# Patient Record
Sex: MALE | Race: WHITE | NOT HISPANIC OR LATINO
[De-identification: names, ages, dates, MRNs, and addresses within clinical notes are randomized per-mention and may not be internally consistent; named-entity substitution may affect disease eponyms.]

---

## 2023-08-07 ENCOUNTER — APPOINTMENT (OUTPATIENT)
Dept: UROLOGY | Facility: CLINIC | Age: 73
End: 2023-08-07
Payer: SELF-PAY

## 2023-08-07 DIAGNOSIS — N48.6 INDURATION PENIS PLASTICA: ICD-10-CM

## 2023-08-07 DIAGNOSIS — R06.2 WHEEZING: ICD-10-CM

## 2023-08-07 DIAGNOSIS — R30.0 DYSURIA: ICD-10-CM

## 2023-08-07 DIAGNOSIS — J30.1 ALLERGIC RHINITIS DUE TO POLLEN: ICD-10-CM

## 2023-08-07 DIAGNOSIS — J45.909 UNSPECIFIED ASTHMA, UNCOMPLICATED: ICD-10-CM

## 2023-08-07 DIAGNOSIS — E78.00 PURE HYPERCHOLESTEROLEMIA, UNSPECIFIED: ICD-10-CM

## 2023-08-07 DIAGNOSIS — N52.9 MALE ERECTILE DYSFUNCTION, UNSPECIFIED: ICD-10-CM

## 2023-08-07 DIAGNOSIS — I51.9 HEART DISEASE, UNSPECIFIED: ICD-10-CM

## 2023-08-07 DIAGNOSIS — J34.9 UNSPECIFIED DISORDER OF NOSE AND NASAL SINUSES: ICD-10-CM

## 2023-08-07 DIAGNOSIS — Z78.9 OTHER SPECIFIED HEALTH STATUS: ICD-10-CM

## 2023-08-07 DIAGNOSIS — F41.9 ANXIETY DISORDER, UNSPECIFIED: ICD-10-CM

## 2023-08-07 DIAGNOSIS — R35.0 FREQUENCY OF MICTURITION: ICD-10-CM

## 2023-08-07 PROCEDURE — 99203 OFFICE O/P NEW LOW 30 MIN: CPT | Mod: 95

## 2023-08-07 RX ORDER — SERTRALINE HYDROCHLORIDE 50 MG/1
50 TABLET, FILM COATED ORAL
Refills: 0 | Status: ACTIVE | COMMUNITY

## 2023-08-07 RX ORDER — ROSUVASTATIN CALCIUM 5 MG/1
5 TABLET, FILM COATED ORAL
Refills: 0 | Status: ACTIVE | COMMUNITY

## 2023-08-07 RX ORDER — EZETIMIBE 10 MG/1
TABLET ORAL
Refills: 0 | Status: ACTIVE | COMMUNITY

## 2023-08-07 RX ORDER — TEMAZEPAM 15 MG/1
15 CAPSULE ORAL
Refills: 0 | Status: ACTIVE | COMMUNITY

## 2023-08-07 RX ORDER — LORAZEPAM 2 MG/1
2 TABLET ORAL
Refills: 0 | Status: ACTIVE | COMMUNITY

## 2023-08-07 NOTE — PLAN
[TextEntry] : The patient will be coming to [place] between August 11 to August 22.  During that time we will have him come twice for a penile duplex ultrasound study and a second time for a cystoscopy and preoperative visit.  We will also schedule a cardiology referral for the patient during his time in [place].

## 2023-08-07 NOTE — REASON FOR VISIT
[Initial Visit ___] : [unfilled] is here today for an initial visit  for [unfilled] [Home] : at home, [unfilled] , at the time of the visit. [Medical Office: ([facility])___] : at the medical office located in  [Spouse] : spouse [Patient] : the patient [Self] : self

## 2023-08-07 NOTE — ASSESSMENT
[FreeTextEntry1] : We discussed the option of a plication only.  The patient is not interested in this option because of the large amount of phosphodiesterase inhibitors that he needs to take.  He is interested in obtaining a penile implant. The patient scheduled this consultation to discuss the different treatment options available for his organic erectile dysfunction. The following note describes the conversation that was performed today during the consultation.    I reviewed the Patient History Form which the patient filled out, made sure that his ailment was organic erectile dysfunction and I discussed in detail with him all previously tried treatments for his ED. We had a thorough discussion about all of the alternatives available, and I made sure to include in our discussion pills such as Levitra/Viagra/Cialis, as well as penile self injectable therapies, MUSE (Medicated Urethral System for Erection), vacuum device, and penile implant.  I stressed the risks and benefits and pros and cons of each of these options extensively. A power point presentation was also used to illustrate each treatment option. The patient was also provided with a packet of written information as well as a list of patients to speak to on the phone.    In discussing penile implant surgery, the patient was made aware of the different types of penile implants- including semirigid devices, 2-piece or Ambicor (AMS) devices, and the inflatable penile prosthesis with 3 components. I went on to mention that there are 2 brands of devices, Coloplast and AMS, and that the AMS is impregnated with antibiotic (inhibizone), and the Coloplast is dipped then coated with an antibiotic. I also referred him to my website in order to obtain additional information about the types of implants available.  He felt he would defer to my judgment as to which device to use.  I also described the highlights and benefits of the "No-Touch" surgical technique and outcome data including number of procedures previously performed and updated rate of infection. In this initial discussion of the penile implant option, I made sure we had a very long and glenis discussion about the risks.  I stated that, first and foremost, infection is the most dreaded risk and complication, which range in incidence from 1-3% of all cases performed in the [place], but that in my hands, using the "No-Touch" technique the incidence of infection is less than 1%.  I stated that should infection occur, the entire device would need to be removed, which typically happens in the first several weeks after surgery.  I explained that should this occur, there would likely be corporal fibrosis, scarring, penile shortening and even penile necrosis and disfigurement.  I said that while I would do absolutely everything possible to reduce and mitigate this risk, if it occur, the device would have to be removed, and then a salvage procedure with a semi-rigid implant possibly done, or the device would have to be removed with delayed re-implantation, or simply avoid future surgery completely.  I explained what this salvage procedure is, and that a new implant could be placed in the same setting with a complex irrigation of antibiotics and saline lavage, but that the infection risk at this salvage procedure is even higher, up to 30%. Furthermore the possible need for hospitalization, prolonged intravenous antibiotics and need further additional surgery was also discussed.  The patient was informed that if the salvage operation failed or if the infected implant were to be removed completely that significant shortening of the penis would occur making implantation of another device very difficult with very poor outcome and patient satisfaction. I explained that this is a real and significant risk that has to be weighed and considered.  Next I expounded on the other risks of the operation.  These include injury to the urethra or bladder, and should these occur, the operation would have to be altered or aborted.  I explained that very rarely, vascular injury and bleeding can happen, and if iliac vein injury occurs from reservoir placement, this could be catastrophic and result in major blood loss and theoretically risk of leg loss in severe instances.    I went on to discuss that after the implant is placed, penile shortening could likely occur, and this is up to 1-2cm total.  Some of this is due to lack of glans engorgement, though MUSE could be used post-operatively to reduce this factor.  Next I also explained the risk of dissatisfaction with the cosmetic or functional result of the device, meaning that he could simply be unhappy with the result.  Some people find that while they have a good full erection, they have changes in sensation, difficulty obtaining an orgasm, and dissatisfaction with sex in general.  I made sure he verbalized and demonstrated a good understanding of these points.  Next, I explained the risk of device breakage or failure, and future operations might be needed should this occur to fix the device tubing breakages with fluid leaks.  There is also a risk of auto-inflation, and even inability to successfully use the device due to technical considerations and inability to use or find the pump.  I did state that I would be available to him to teach him and train him to use his device, and also available to treat any other issue mentioned above such as device breakage or auto-inflation.  Next we discussed the fact that rarely further minor surgery may be needed to make final adjustments to the penile implant. Reasons for this would be to adjust the length of the cylinders, reposition the pump or location of the reservoir.   Prior to scheduling surgery the patient was asked to read the material which is provided to him today, to see a cardiologist to obtain a medical clearance, to visit our website [email or web address]   to obtain additional information, to call patients who were previously implanted and to discuss his options with his partner. Before leaving the consultation, I made sure he verbalized understanding all the risk and benefits, and pros and cons of surgery.  He had the ability to ask questions, and I also explained to him what to expect from the surgery.  I made sure he had access to literature to read, and offered him the ability to speak to prior patients of mine to get a sense of what to expect, and that these reports would hopefully be as unbiased as possible. If he remains interested in having an implant he understands that he will need to schedule a penile Duplex ultrasound study during which measurements of the penis will be made.

## 2023-08-07 NOTE — HISTORY OF PRESENT ILLNESS
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  Currently he is no longer able to achieve intercourse  because of the penile deformity.  The patient is able to overdose with phosphodiesterase inhibitor a combination of tadalafil and sildenafil and obtain an erection but a he feels that this is no longer satisfactory.  With medication his sexual health inventory for questionnaire was a score of 12 consistent with mild to moderate erectile dysfunction.  Patient has been  for 46 years.  Quality of relationship is very good.  He currently takes 100 mg of Viagra with the 10 mg of Cialis to obtain an erection.  Patient has history of hypertension high cholesterol and mild coronary disease.  He had a recent angio CT which revealed a very small plaque otherwise normal.  He does not take a baby aspirin.

## 2023-08-14 ENCOUNTER — APPOINTMENT (OUTPATIENT)
Dept: UROLOGY | Facility: CLINIC | Age: 73
End: 2023-08-14
Payer: MEDICARE

## 2023-08-14 VITALS
HEIGHT: 66 IN | TEMPERATURE: 97.2 F | SYSTOLIC BLOOD PRESSURE: 128 MMHG | BODY MASS INDEX: 25.23 KG/M2 | DIASTOLIC BLOOD PRESSURE: 82 MMHG | WEIGHT: 157 LBS

## 2023-08-14 PROCEDURE — 99215 OFFICE O/P EST HI 40 MIN: CPT | Mod: 25

## 2023-08-14 PROCEDURE — 54235 NJX CORPORA CAVERNOSA RX AGT: CPT

## 2023-08-14 PROCEDURE — 93980 PENILE VASCULAR STUDY: CPT

## 2023-08-14 NOTE — END OF VISIT
[Time Spent: ___ minutes] : I have spent [unfilled] minutes of time on the encounter. [TextEntry] : The complete time calculation (40    minutes) for this patient encounter, includes a review of the patient's past medical records pertinent to his chief complaint, including medical, and surgical history, review of medications taken and of previous visits with other health care providers. In addition to the time spent with the patient, the total time calculation also includes the time necessary to document all of the formation gathered during this  session into the patient's electronic health records.

## 2023-08-14 NOTE — ASSESSMENT
[FreeTextEntry1] : PENILE INJECTION TEST:  GIANLUCA WOLF  08/11/2023   The patient was placed supine on the procedure table.  The left side of the penis was prepped with alcohol, and the patient received 20 mcg @ 1 cc of Alprostadil. The patient tolerated the injection well.  No bleeding occurred at the injection site.     The patient was examined at 5, 10, 30 and 45 minutes interval post injection:   The patients penis was:   13   cm stretched  Deformity: Narrowing:  An hour glass deformity:  Curvature:to the left 90 degrees.   Post Injection Erectile Response: At 5 minutes:  25    % rigid erection was noted.  At 15 minutes: 50  % rigidity.   At 30 minutes:  70   % rigidity.  Spontaneous detumescence occurred after 60 minutes.    The patient was discharged with a soft erection and was advised to call should his erection become more rigid.  Post response evaluation by the patient: The patient described that this erection was better than his sexually induced erections.   The erection was not adequate for vaginal penetration. Impression:         Severe organic erectile dysfunction.  Recommendation:   Insertion of inflatable implant      PENILE DUPLEX SONOGRAPHY WITH PULSED DOPPLER ANALYSIS: Procedure description: The flaccid penis was scanned with the 18 MHz probe and images obtained longitudinally.   The diameters of the corporal arteries were measured:  The right cavernosal artery measured:0.66  mm The left cavernosal artery measured:  0.63  mm  Following intracavernous injection of alprostadil  20     microgram/ml in   0.5  ml, the penis was rescanned and the diameter of the cavernosal arteries were measured again to assess distensibility in response to the vasoactive medication.  (A 100% increase in diameter is normally expected.)  The left cavernosal artery measured: 0.85   mm The right cavernosal artery measured: 0.90   mm  Pulsed Doppler analysis: Each of the selective imaged arteries underwent penile Doppler analysis with generation of waveform.  The peak velocity data of the cavernosal arteries is tabulated below:  (A velocity of 35 cm/s or more is normally expected.)   Resistive index parameters were obtained bilaterally (normal is 100%):   Results:  Left cavernosal artery peak systolic velocity at 15 minutes: 32.6   centimeters per second Let cavernosal end-diastolic velocity at 15 minutes:       0         centimeters per second Left cavernosal artery resistive index:    100   %   Right cavernosal artery peak systolic velocity at 15 minutes:  34.6  centimeters per second  Right end-diastolic velocity at 15 minutes:             /4.17                    centimeters per second Right cavernosal artery resistive index:   112   %   Spontaneous detumescence occurred after 60  minutes The patient was discharged with a soft erection and was advised to call should an erection persist for more than 4 hours.    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  After the Duplex study was terminated, I sat with the patient for 45 minutes and I explained to him the findings of the study. I also discussed his diagnosis with him as well as the recommended course of action. He understands the reason why he has ED and agrees with the plan of action.  The summary points of our discussion after the Duplex test are that: 1) The results of the Duplex study were reviewed with the patient and that his ailment is organic erectile dysfunction, refractory to other treatments, or at least other options were offered but rejected. 2) The proposed treatment is the inflatable, permanent, penile prosthesis that we have discussed in great detail at outlined in our previous discussion. 3) The probability of success is over 90%, but this depends on whether or not there are complications.  The complications can be very serious and certainly can occur, including infection, breakage, and injury to surrounding structures.  These events would quality as a "failure", and he understands this completely. 4) The risks are outlined above, but infection is 1-2% in the best circumstances, the chance of other events happening is low but possible, including urethral perforation, bladder perforation, or device breakage. With all of this stated, he decided in light of all of this discussion and different treatment options available, he would like to move forward with 3 piece inflatable penile prosthesis placement.  He was informed that penile implant surgery is an end of the line therapy, and once this is undertaken, one cannot go back and attempt to use injections or pills and expect these to work should the implant be removed for infection or fail to be satisfactory. He understands that he will need to be medically cleared before proceeding with scheduling a dated for the procedure. Following the Duplex study, I spent an additional 35 minutes with the patient.

## 2023-08-16 ENCOUNTER — APPOINTMENT (OUTPATIENT)
Dept: HEART AND VASCULAR | Facility: CLINIC | Age: 73
End: 2023-08-16

## 2023-08-17 ENCOUNTER — APPOINTMENT (OUTPATIENT)
Dept: UROLOGY | Facility: CLINIC | Age: 73
End: 2023-08-17
Payer: MEDICARE

## 2023-08-17 VITALS
TEMPERATURE: 97.8 F | DIASTOLIC BLOOD PRESSURE: 78 MMHG | BODY MASS INDEX: 26.09 KG/M2 | SYSTOLIC BLOOD PRESSURE: 122 MMHG | WEIGHT: 162.38 LBS | HEIGHT: 66 IN

## 2023-08-17 DIAGNOSIS — Z01.818 ENCOUNTER FOR OTHER PREPROCEDURAL EXAMINATION: ICD-10-CM

## 2023-08-17 PROCEDURE — 52000 CYSTOURETHROSCOPY: CPT

## 2023-08-17 PROCEDURE — 99215 OFFICE O/P EST HI 40 MIN: CPT | Mod: 25

## 2023-08-17 PROCEDURE — 51741 ELECTRO-UROFLOWMETRY FIRST: CPT

## 2023-08-17 PROCEDURE — 51798 US URINE CAPACITY MEASURE: CPT | Mod: 59

## 2023-08-17 PROCEDURE — 51725 SIMPLE CYSTOMETROGRAM: CPT

## 2023-08-17 NOTE — PLAN
[TextEntry] : In this pre-operative setting, I spent an additional 65 minutes to the procedures performed today, ensuring that the discussions we had in the office during the patient's initial consultation and penile Doppler visit was still clear in his mind, that he understood the risks and benefits and pros and cons of the penile implant procedure, including treatment alternatives, and that he verbalized the risks and wanted to proceed.  I again made sure he knew that the penile implant is a prosthesis that contains three basic elements consisting of: two parallel hollow cylinders that are placed within the penis, a pump and valve mechanism that is placed in the scrotum, and a spherical reservoir filled with saline that is placed in the lower abdomen. Although the penile prosthesis is placed through an incision in the scrotum, I explained that this is a surgical procedure that is relatively simple but can be more complicated in the presence of scar tissue in the pelvic area due to previous surgery and which will sometimes require an additional or alternative incision for placement of the reservoir, or potentially a narrow-based device if extensive fibrosis of the penis noted.   We reiterated that the penile implant is designed to simulate but not necessarily replace the natural erectile capability that he previously had.  Once implanted, there will be limited or no capability of natural erections occurring in the future and will limit the opportunity for other treatment options such as pills or injections, to successfully work in his body. When inflated, I explained and he understood that the implant will expand in girth, but not length and when deflated, the penis will become flaccid but potentially remain extended and will not retract due to the cylinders that are placed in the penile shaft.  The implant reservoir has a lock-out valve to limit the opportunity for fluid to flow into the cylinders and create an unexpected or unwanted erection, called "Auto Inflation".  Due to the lock-out valve, this rarely occurs, however if the reservoir is allowed to heal with the penile cylinders maintained partially inflated, he recognized that a partial erection will always be present and it is possible for auto-inflation to occur. We talked about the fact that the penile implant will not grow in length beyond the basic, "stretched penis" level and the measurement, which were obtained during the penile Duplex study.  This is the length he considers to be sufficient for sexual intercourse.  The glans, or tip, of the penis will not expand, as the tip of the cylinders is designed to stabilize and support the glans, but not inflate beyond the penile corona.  He was also counseled that although the results with an inflatable penile prosthesis are very good, the erection will never be as large as the previous normal erection.  The size of the flaccid penis may differ from that before the surgery. He understands that the risk of serious complications during surgery is very low.  There are a number of difficulties that can arise at the time of surgery, and are usually overcome.  Rarely the operation cannot be completed or an alternative to an inflatable penile prosthesis placed such as a semi-rigid implant.  The prosthesis is a mechanical device, which may suffer mechanical failure.  The current 10-year survival rate of the inflatable penile prosthesis is greater than 90%.  An operation can be performed to replace a device that has suffered mechanical failure. He has discussed the post-op care and expectations with Candida and me.  He realizes that it is not uncommon for patients to experience swelling, bruising, pain, irritation, etc for the first four-to-six weeks after the procedure.   Lastly, I made sure again that he understood that, like any surgical procedure, complications may occur.  These complications include but are not limited to infection (1-4%) and erosion (1-11%) (Erosion is when the prosthesis erodes through to the outside of the body).  An infected prosthesis cannot be saved by antibiotics alone and must then be removed.  It is possible to insert prosthesis at the same operative session (salvage) or at a later date but this is more difficult and the infection rate is higher. If another prosthesis cannot be inserted at the same time as the infected one removed, significant and irreversible penile shortening will occur.             I asked him if he had any further questions and made sure that he understood all of all the pre-operative instructions which were reviewed with him by Candida my . He was provided with written pre and postoperative instructions, prescriptions and my private cell number. I informed him that he may call for any questions or concerns at any time.

## 2023-08-17 NOTE — END OF VISIT
[Time Spent: ___ minutes] : I have spent [unfilled] minutes of time on the encounter. [FreeTextEntry3] : The complete time calculation (45 minutes) for this patient encounter, includes a review of the patient's past medical records pertinent to his chief complaint, including medical, and surgical history, review of medications taken and of previous visits with other health care providers. In addition to the time spent with the patient, the total time calculation also includes the time necessary to document all of the formation gathered during this  session into the patient's electronic health records.

## 2023-08-17 NOTE — ASSESSMENT
[FreeTextEntry1] : GIANLUCA WOLF  [birth year]  Procedure: Cystourethroscopy  Location: [facility] at [address] [place] [address]  Surgeon: MAISHA Valdez M.D.  Preop Diagnosis: Pre-operative evaluation, urinary frequency,BPH,obstruction/Urinary retention  Postop Diagnosis: BPH with bladder outlet obstruction and urinary retention Anesthesia: 2% Intraurethral Lidocaine Jelly  Medication: Ciprofloxacin 500 mg  Complication: None  I discussed with the patient, the risks and benefits of cystourethroscopy which includes hematuria, dysuria, stricture formation. The patient agrees to proceed with the above procedure:  Procedure description: The patient was placed in the dorsal lithotomy position, prepped and draped in the usual standard sterile fashion with surgical Betadine soap and wash. 2% lidocaine jelly was inserted intraurethrally via the meatus, and a clamp was applied to the penis and lidocaine jelly was allowed to remain in place for 5 minutes. The meatus was then intubated with a #16 [place]vian flexible disposable cystoscope. Visual cystourethroscopic examination was initiated under sterile water irrigation.  The following findings were noted:  The anterior urethra was normal.  The Bulbar urethra was also normal.  The membranous urethra was normal without any strictures.  The prostatic urethra, lateral lobe and verumontanum appeared consistent with BPH.  The prostatic urethra was mildly obstructed with bilobar hypertrophy and an elevated bladder neck.  Ureteral orifices were identified and clear efflux of urine was observed bilaterally.  The bladder was examined in its entirety in a systematic fashion. Trabeculation observed: Moderate No mucosal abnormalities, stone, tumors, foreign bodies or diverticula identified. On retroflex, there was no significant prostatic tissue protruding into the bladder.  The patient tolerated the procedure well and he was discharged to home in stable condition.  CYSTOMETROGRAM: Preop Diagnosis: Increased Frequency of urination.  Postop Diagnosis: Increased Frequency of urination. Anesthesia: 2 % Intraurethral Lidocaine Jelly  Medication: Ciprofloxacin Complications: None  Procedure Note:  The findings observed are described in the cystoscopy report. With the flexible cystocope indwelling into the bladder, a sterile line of intravenous saline was connected into a manometer. This allowed us to measure the amount of fluid instilled and intravesical pressure.   The following findings of the cystometrogram were noted.  Bladder wall compliance: Normal Functional bladder capacity:   290  cc The patient perceived a first sensation that the bladder was filling with saline at:  150   cc His first urge to void was observed at  200  cc of saline.  His post void residual was measured at  17   cc Impression: Voiding proprioception: normal  Detrusor instability: not present  Summary: Normal study and no contraindication to proceeding with the surgery. The patient tolerated the procedure well.  BLADDER SCAN: Indication: Increased frequency of urination. Initial Volume:  290 cc PVR: 19  cc Results: Urinary retention Recommendations: No Therapy Needed.  URO FLOWMETRY: Indication: Increased frequency of urination. Urinary Flow Rate:  13.8 m/s Results: Urinary retention Recommendations: No therapy needed.  GIANLUCA WOLF  Apr 28 1950/2023  Procedure: Cystourethroscopy  Location: [facility] at [address] [place] [address]  Surgeon: MAISHA Valdez M.D.  Preop Diagnosis: Pre-operative evaluation, urinary frequency,BPH,obstruction/Urinary retention  Postop Diagnosis: BPH with bladder outlet obstruction and urinary retention Anesthesia: 2% Intraurethral Lidocaine Jelly  Medication: Ciprofloxacin 500 mg  Complication: None  I discussed with the patient, the risks and benefits of cystourethroscopy which includes hematuria, dysuria, stricture formation. The patient agrees to proceed with the above procedure:   Procedure description: The patient was placed in the dorsal lithotomy position, prepped and draped in the usual standard sterile fashion with surgical Betadine soap and wash. 2% lidocaine jelly was inserted intraurethrally via the meatus, and a clamp was applied to the penis and lidocaine jelly was allowed to remain in place for 5 minutes. The meatus was then intubated with a #16 Latvian flexible disposable cystoscope. Visual cystourethroscopic examination was initiated under sterile water irrigation.  The following findings were noted:  The anterior urethra was normal.  The Bulbar urethra was also normal.  The membranous urethra was normal without any strictures.  The prostatic urethra, lateral lobe and verumontanum appeared consistent with BPH.  The prostatic urethra was mildly obstructed with bilobar hypertrophy and an elevated bladder neck.  Ureteral orifices were identified and clear efflux of urine was observed bilaterally.  The bladder was examined in its entirety in a systematic fashion. Trabeculation observed. A few diverticula. No mucosal abnormalities, stone, tumors, foreign bodies identified. On retroflex, there was no significant prostatic tissue protruding into the bladder.  The patient tolerated the procedure well and he was discharged to home in stable condition.  A/P: 73M w/ ED interested in IPP - s/p cystoscopy today w/ trabeculations and few diverticula, otherwise open - pt requires cardiac clearance (to be obtained in [facility]) - plan for IPP in the future

## 2023-08-17 NOTE — HISTORY OF PRESENT ILLNESS
[FreeTextEntry1] : 73M w/ ED Persistent erectile dysfunction and severe penile curvature.  Patient wishes to proceed with penile implant. Presents today for pre-operative cystoscopy

## 2023-08-17 NOTE — PHYSICAL EXAM
[General Appearance - Well Developed] : well developed [General Appearance - Well Nourished] : well nourished [Normal Appearance] : normal appearance [Well Groomed] : well groomed [General Appearance - In No Acute Distress] : no acute distress [Abdomen Soft] : soft [Abdomen Tenderness] : non-tender [Costovertebral Angle Tenderness] : no ~M costovertebral angle tenderness [Urethral Meatus] : meatus normal [Urinary Bladder Findings] : the bladder was normal on palpation [Scrotum] : the scrotum was normal [Testes Mass (___cm)] : there were no testicular masses [No Prostate Nodules] : no prostate nodules [FreeTextEntry1] : Plaque is palpable near the base of the penis more pronounced on the left than on the right.

## 2023-09-21 ENCOUNTER — APPOINTMENT (OUTPATIENT)
Dept: UROLOGY | Facility: CLINIC | Age: 73
End: 2023-09-21
Payer: MEDICARE

## 2023-09-21 DIAGNOSIS — N48.89 OTHER SPECIFIED DISORDERS OF PENIS: ICD-10-CM

## 2023-09-21 DIAGNOSIS — Z00.00 ENCOUNTER FOR GENERAL ADULT MEDICAL EXAMINATION W/OUT ABNORMAL FINDINGS: ICD-10-CM

## 2023-09-21 PROCEDURE — 99214 OFFICE O/P EST MOD 30 MIN: CPT | Mod: 95

## 2023-10-24 RX ORDER — CEPHALEXIN 250 MG/1
250 CAPSULE ORAL
Qty: 84 | Refills: 0 | Status: ACTIVE | COMMUNITY
Start: 2023-10-24 | End: 1900-01-01

## 2023-10-24 RX ORDER — DOCUSATE SODIUM 100 MG/1
100 CAPSULE ORAL TWICE DAILY
Qty: 56 | Refills: 0 | Status: ACTIVE | COMMUNITY
Start: 2023-10-24 | End: 1900-01-01

## 2023-10-24 RX ORDER — ACETAMINOPHEN AND CODEINE PHOSPHATE 300; 30 MG/1; MG/1
300-30 TABLET ORAL EVERY 4 HOURS
Qty: 40 | Refills: 0 | Status: ACTIVE | COMMUNITY
Start: 2023-10-24 | End: 1900-01-01

## 2023-10-24 RX ORDER — MAGNESIUM HYDROXIDE 400 MG/5ML
7.75 SUSPENSION, ORAL (FINAL DOSE FORM) ORAL
Qty: 355 | Refills: 0 | Status: ACTIVE | COMMUNITY
Start: 2023-10-24 | End: 1900-01-01

## 2023-10-24 RX ORDER — IBUPROFEN 600 MG/1
600 TABLET, FILM COATED ORAL 3 TIMES DAILY
Qty: 56 | Refills: 0 | Status: ACTIVE | COMMUNITY
Start: 2023-10-24 | End: 1900-01-01

## 2023-10-24 RX ORDER — DOCUSATE SODIUM 100 MG/1
100 CAPSULE, LIQUID FILLED ORAL TWICE DAILY
Qty: 56 | Refills: 0 | Status: ACTIVE | COMMUNITY
Start: 2023-10-24 | End: 1900-01-01

## 2023-10-24 RX ORDER — OXYCODONE AND ACETAMINOPHEN 5; 325 MG/1; MG/1
5-325 TABLET ORAL EVERY 4 HOURS
Qty: 40 | Refills: 0 | Status: ACTIVE | COMMUNITY
Start: 2023-10-24 | End: 1900-01-01

## 2023-10-26 RX ORDER — GENTAMICIN SULFATE 40 MG/ML
240 VIAL (ML) INJECTION ONCE
Refills: 0 | Status: DISCONTINUED | OUTPATIENT
Start: 2023-10-27 | End: 2023-10-27

## 2023-10-26 RX ORDER — SODIUM CHLORIDE 9 MG/ML
1000 INJECTION, SOLUTION INTRAVENOUS
Refills: 0 | Status: DISCONTINUED | OUTPATIENT
Start: 2023-10-27 | End: 2023-10-27

## 2023-10-26 NOTE — ASU PATIENT PROFILE, ADULT - NSICDXPASTMEDICALHX_GEN_ALL_CORE_FT
PAST MEDICAL HISTORY:  Mild asthma      PAST MEDICAL HISTORY:  Anxiety     CAD (coronary artery disease)     HLD (hyperlipidemia)     Mild asthma

## 2023-10-26 NOTE — ASU PATIENT PROFILE, ADULT - NSICDXPASTSURGICALHX_GEN_ALL_CORE_FT
PAST SURGICAL HISTORY:  H/O colonoscopy      PAST SURGICAL HISTORY:  H/O cataract extraction bilateral    H/O colonoscopy

## 2023-10-26 NOTE — ASU PATIENT PROFILE, ADULT - SITE
Please offer patient 3:30 at [facility] any day of the week. This time will be open for plain stress tests only moving forward.    Penile prosthesis

## 2023-10-27 ENCOUNTER — TRANSCRIPTION ENCOUNTER (OUTPATIENT)
Age: 73
End: 2023-10-27

## 2023-10-27 ENCOUNTER — APPOINTMENT (OUTPATIENT)
Dept: UROLOGY | Facility: AMBULATORY SURGERY CENTER | Age: 73
End: 2023-10-27
Payer: MEDICARE

## 2023-10-27 ENCOUNTER — OUTPATIENT (OUTPATIENT)
Dept: OUTPATIENT SERVICES | Facility: HOSPITAL | Age: 73
LOS: 1 days | Discharge: HOME | End: 2023-10-27

## 2023-10-27 VITALS
OXYGEN SATURATION: 98 % | WEIGHT: 155.21 LBS | RESPIRATION RATE: 16 BRPM | HEIGHT: 66.5 IN | SYSTOLIC BLOOD PRESSURE: 103 MMHG | HEART RATE: 88 BPM | DIASTOLIC BLOOD PRESSURE: 59 MMHG | TEMPERATURE: 98 F

## 2023-10-27 VITALS
OXYGEN SATURATION: 99 % | DIASTOLIC BLOOD PRESSURE: 67 MMHG | SYSTOLIC BLOOD PRESSURE: 124 MMHG | TEMPERATURE: 98 F | RESPIRATION RATE: 16 BRPM | HEART RATE: 78 BPM

## 2023-10-27 DIAGNOSIS — Z98.890 OTHER SPECIFIED POSTPROCEDURAL STATES: Chronic | ICD-10-CM

## 2023-10-27 DIAGNOSIS — Z98.49 CATARACT EXTRACTION STATUS, UNSPECIFIED EYE: Chronic | ICD-10-CM

## 2023-10-27 PROCEDURE — 54405 INSERT MULTI-COMP PENIS PROS: CPT | Mod: GC

## 2023-10-27 PROCEDURE — 54235 NJX CORPORA CAVERNOSA RX AGT: CPT | Mod: GC

## 2023-10-27 PROCEDURE — 54360 PENIS PLASTIC SURGERY: CPT | Mod: GC

## 2023-10-27 DEVICE — KIT PENILE TITAN ASSEMBLY STANDARD: Type: IMPLANTABLE DEVICE | Status: FUNCTIONAL

## 2023-10-27 DEVICE — IMP PENILE TITAN CYL PUMP SET SCROTAL 0 ANGLE 18CM: Type: IMPLANTABLE DEVICE | Status: FUNCTIONAL

## 2023-10-27 DEVICE — SURGIFLO HEMOSTATIC MATRIX KIT: Type: IMPLANTABLE DEVICE | Status: FUNCTIONAL

## 2023-10-27 DEVICE — RESERVIOR TITAN CLOVERLEAF 75CC: Type: IMPLANTABLE DEVICE | Status: FUNCTIONAL

## 2023-10-27 RX ORDER — FENTANYL CITRATE 50 UG/ML
50 INJECTION INTRAVENOUS ONCE
Refills: 0 | Status: DISCONTINUED | OUTPATIENT
Start: 2023-10-27 | End: 2023-10-27

## 2023-10-27 RX ORDER — ONDANSETRON 8 MG/1
4 TABLET, FILM COATED ORAL ONCE
Refills: 0 | Status: DISCONTINUED | OUTPATIENT
Start: 2023-10-27 | End: 2023-10-27

## 2023-10-27 RX ORDER — ALBUTEROL 90 UG/1
0 AEROSOL, METERED ORAL
Refills: 0 | DISCHARGE

## 2023-10-27 RX ORDER — EZETIMIBE 10 MG/1
0.5 TABLET ORAL
Refills: 0 | DISCHARGE

## 2023-10-27 RX ORDER — ROSUVASTATIN CALCIUM 5 MG/1
1 TABLET ORAL
Refills: 0 | DISCHARGE

## 2023-10-27 RX ORDER — ACETAMINOPHEN 500 MG
650 TABLET ORAL ONCE
Refills: 0 | Status: DISCONTINUED | OUTPATIENT
Start: 2023-10-27 | End: 2023-10-27

## 2023-10-27 RX ORDER — OXYCODONE HYDROCHLORIDE 5 MG/1
5 TABLET ORAL ONCE
Refills: 0 | Status: DISCONTINUED | OUTPATIENT
Start: 2023-10-27 | End: 2023-10-27

## 2023-10-27 RX ORDER — VANCOMYCIN HCL 1 G
1250 VIAL (EA) INTRAVENOUS ONCE
Refills: 0 | Status: COMPLETED | OUTPATIENT
Start: 2023-10-27 | End: 2023-10-27

## 2023-10-27 RX ORDER — CHLORHEXIDINE GLUCONATE 213 G/1000ML
1 SOLUTION TOPICAL ONCE
Refills: 0 | Status: COMPLETED | OUTPATIENT
Start: 2023-10-27 | End: 2023-10-27

## 2023-10-27 RX ORDER — TEMAZEPAM 15 MG/1
1 CAPSULE ORAL
Refills: 0 | DISCHARGE

## 2023-10-27 RX ADMIN — CHLORHEXIDINE GLUCONATE 1 APPLICATION(S): 213 SOLUTION TOPICAL at 06:10

## 2023-10-27 RX ADMIN — SODIUM CHLORIDE 200 MILLILITER(S): 9 INJECTION, SOLUTION INTRAVENOUS at 07:04

## 2023-10-27 RX ADMIN — FENTANYL CITRATE 50 MICROGRAM(S): 50 INJECTION INTRAVENOUS at 10:35

## 2023-10-27 RX ADMIN — Medication 166.67 MILLIGRAM(S): at 06:40

## 2023-10-27 NOTE — ASU DISCHARGE PLAN (ADULT/PEDIATRIC) - NS MD DC FALL RISK RISK
For information on Fall & Injury Prevention, visit: [email or web address] OR  [email or web address] OR  https://www.cdc.gov/steadi/patient.html

## 2023-10-27 NOTE — ASU DISCHARGE PLAN (ADULT/PEDIATRIC) - ASU DC SPECIAL INSTRUCTIONSFT
INFLATABLE PENILE PROSTHESIS    SURGICAL WOUND: There are often lumps and bumps that can be felt in the scrotum on either or both sides up to two (2) months or more post operatively. These are of no concern and with time they will soften and disappear.  Any “black and blue” bruising areas will also resolve.  Normally, there is also swelling of the scrotum post operatively. Sometimes the tissue fluid which causes the swelling migrates to the penile skin and can look alarming; with time, all the swelling will eventually subside but may take weeks.  A scrotal support and scrotal fluffs should be worn at all times for the next few weeks, unless bathing, to minimize this swelling. You may apply an ice-pack for 15 minutes out of every hour for the first 24 -36 hours to minimize pain and swelling.    STITCHES: The stitches in the incision will dissolve and fall out by themselves. Sometimes skin stitches may open, allowing a slight gaping of the incision. This is no problem if you keep the area clean.  There is a bluish colored waterproof glue over the incision as well – the glue will peel away and fall off on its own over a couple of weeks.      CATHETER: Some patients are sent home with a Colon catheter, while others go home urinating on their own. A Colon catheter continuously drains the urine from the bladder. If you still have a catheter, the nurses will review instructions and care before you go home. For men, you may have a prescription for lidocaine jelly to apply to the tip of your penis, as needed, for catheter related discomfort.     PAIN: You may have some intermittent pain for up to six (6) weeks post operatively. Pain does not signify any problem unless associated with fever, chills, or inability to void.  If you experience any fevers or chills please call immediately as this may be signs of an infection. You may take Tylenol (acetaminophen) 650-975mg and/or Motrin (ibuprofen) 400-600mg, both available over the counter, for pain every 6 hours as needed. Do not exceed 4000mg of Tylenol (acetaminophen) daily. You may alternate these medications such that you take one or the other every 3 hours for around the clock pain coverage. For severe pain, take Percocet 5/325mg every 6 hours.     ANTIBIOTICS: You may be given a prescription for an antibiotic, please take this medication as instructed and be sure to complete the entire course.   STOOL SOFTENERS: Do not allow yourself to become constipated as straining may cause bleeding. Take stool softeners or a laxative (ex. Miralax, Colace, Senokot, ExLax, etc), available over the counter, if taking Percocet.     ANTICOAGULATION: If you are taking any blood thinning medications, please discuss with your urologist prior to restarting these medications unless otherwise specified.    BATHING: You may sponge bath 24 hours after surgery, but minimize water to the surgical incision and drain.    DIET: You may resume your regular diet and regular medication regimen.    ACTIVITY: No heavy lifting or strenuous exercise until you are evaluated at your post-operative appointment. Otherwise, you may return to your usual level of physical activity.    FOLLOW-UP: If you did not already schedule your post-operative appointment, please call your urologist to schedule and follow-up appointment.    CALL YOUR UROLOGIST IF: You have any bleeding that does not stop, inability to void >8 hours, fever over 100.4 F, chills, persistent nausea/vomiting, changes in your incision concerning for infection, or if your pain is not controlled on your discharge pain medications.

## 2023-10-27 NOTE — ASU DISCHARGE PLAN (ADULT/PEDIATRIC) - CARE PROVIDER_API CALL
Courtney, Cecy  Urology  [address] [place] [id number]  Phone: [phone]  Fax: [phone]  Follow Up Time:

## 2023-10-30 ENCOUNTER — APPOINTMENT (OUTPATIENT)
Dept: UROLOGY | Facility: CLINIC | Age: 73
End: 2023-10-30
Payer: MEDICARE

## 2023-10-30 VITALS
BODY MASS INDEX: 26.03 KG/M2 | HEIGHT: 66 IN | SYSTOLIC BLOOD PRESSURE: 118 MMHG | DIASTOLIC BLOOD PRESSURE: 62 MMHG | WEIGHT: 162 LBS | TEMPERATURE: 97.5 F

## 2023-10-30 DIAGNOSIS — Z09 ENCOUNTER FOR FOLLOW-UP EXAMINATION AFTER COMPLETED TREATMENT FOR CONDITIONS OTHER THAN MALIGNANT NEOPLASM: ICD-10-CM

## 2023-10-30 DIAGNOSIS — R35.0 FREQUENCY OF MICTURITION: ICD-10-CM

## 2023-10-30 DIAGNOSIS — R06.6 HICCOUGH: ICD-10-CM

## 2023-10-30 PROCEDURE — 99024 POSTOP FOLLOW-UP VISIT: CPT

## 2023-10-30 RX ORDER — CHLORPROMAZINE HYDROCHLORIDE 50 MG/1
50 TABLET, SUGAR COATED ORAL 3 TIMES DAILY
Qty: 30 | Refills: 0 | Status: ACTIVE | COMMUNITY
Start: 2023-10-30 | End: 1900-01-01

## 2023-11-14 PROBLEM — E78.5 HYPERLIPIDEMIA, UNSPECIFIED: Chronic | Status: ACTIVE | Noted: 2023-10-27

## 2023-11-14 PROBLEM — F41.9 ANXIETY DISORDER, UNSPECIFIED: Chronic | Status: ACTIVE | Noted: 2023-10-27

## 2023-11-14 PROBLEM — J45.909 UNSPECIFIED ASTHMA, UNCOMPLICATED: Chronic | Status: ACTIVE | Noted: 2023-10-26

## 2023-11-14 PROBLEM — I25.10 ATHEROSCLEROTIC HEART DISEASE OF NATIVE CORONARY ARTERY WITHOUT ANGINA PECTORIS: Chronic | Status: ACTIVE | Noted: 2023-10-27

## 2023-11-15 ENCOUNTER — NON-APPOINTMENT (OUTPATIENT)
Age: 73
End: 2023-11-15

## 2023-11-20 ENCOUNTER — APPOINTMENT (OUTPATIENT)
Dept: UROLOGY | Facility: CLINIC | Age: 73
End: 2023-11-20
Payer: MEDICARE

## 2023-11-20 DIAGNOSIS — N52.8 OTHER MALE ERECTILE DYSFUNCTION: ICD-10-CM

## 2023-11-20 DIAGNOSIS — N48.6 INDURATION PENIS PLASTICA: ICD-10-CM

## 2023-11-20 DIAGNOSIS — Z09 ENCOUNTER FOR FOLLOW-UP EXAMINATION AFTER COMPLETED TREATMENT FOR CONDITIONS OTHER THAN MALIGNANT NEOPLASM: ICD-10-CM

## 2023-11-20 DIAGNOSIS — Z48.816 ENCOUNTER FOR SURGICAL AFTERCARE FOLLOWING SURGERY ON THE GENITOURINARY SYSTEM: ICD-10-CM

## 2023-11-20 DIAGNOSIS — Z96.0 PRESENCE OF UROGENITAL IMPLANTS: ICD-10-CM

## 2023-11-20 DIAGNOSIS — N48.6 OTHER MALE ERECTILE DYSFUNCTION: ICD-10-CM

## 2023-11-20 PROCEDURE — 99024 POSTOP FOLLOW-UP VISIT: CPT

## 2023-12-06 DIAGNOSIS — N50.811 RIGHT TESTICULAR PAIN: ICD-10-CM

## 2023-12-11 ENCOUNTER — APPOINTMENT (OUTPATIENT)
Dept: UROLOGY | Facility: CLINIC | Age: 73
End: 2023-12-11

## (undated) DEVICE — SLV COMPRESSION KNEE MED

## (undated) DEVICE — DRSG DERMABOND 0.7ML

## (undated) DEVICE — BAG DECANTER IV STERILE

## (undated) DEVICE — SUPP ATHLETIC MALE XLG 44-55IN

## (undated) DEVICE — PACK PROST PENILE LNX SURGICOUNT

## (undated) DEVICE — DRSG COMBINE 5X9"

## (undated) DEVICE — BAG SPONGE COUNTER EZ

## (undated) DEVICE — PREP SCRUB BRUSH W CHG 4%

## (undated) DEVICE — ELCTR PENCIL SMOKE EVACUATOR COATED PUSH BUTTON 70MM

## (undated) DEVICE — LONE STAR DILAMEZINSERT INSERTER BLUE 12MM

## (undated) DEVICE — SUT PROLENE 4-0 14" V-47

## (undated) DEVICE — DRAIN URINARY LEG BAG WITH FLIP-FLO VALVE 32OZ

## (undated) DEVICE — GLV 9 PROTEXIS (WHITE)

## (undated) DEVICE — GLV 7 PROTEXIS (WHITE)

## (undated) DEVICE — MARKING PEN DEVON DUAL TIP W RULER

## (undated) DEVICE — LONE STAR ELASTIC STAY HOOK 12MM BLUNT

## (undated) DEVICE — WARMING BLANKET UPPER ADULT

## (undated) DEVICE — SUT PDS II 3-0 27" RB-1

## (undated) DEVICE — GLV 8 PROTEXIS (WHITE)

## (undated) DEVICE — DRSG TAPE UMBILICAL COTTON 2" X 30 X 1/8"

## (undated) DEVICE — SUT VICRYL 3-0 27" RB-1 UNDYED

## (undated) DEVICE — PREP CHLORAPREP HI-LITE ORANGE 26ML